# Patient Record
Sex: MALE | Race: WHITE | NOT HISPANIC OR LATINO | Employment: FULL TIME | URBAN - METROPOLITAN AREA
[De-identification: names, ages, dates, MRNs, and addresses within clinical notes are randomized per-mention and may not be internally consistent; named-entity substitution may affect disease eponyms.]

---

## 2021-09-02 ENCOUNTER — OFFICE VISIT (OUTPATIENT)
Dept: INTERNAL MEDICINE | Facility: CLINIC | Age: 36
End: 2021-09-02

## 2021-09-02 VITALS
HEART RATE: 84 BPM | BODY MASS INDEX: 24.52 KG/M2 | WEIGHT: 185 LBS | SYSTOLIC BLOOD PRESSURE: 124 MMHG | OXYGEN SATURATION: 99 % | HEIGHT: 73 IN | TEMPERATURE: 96.6 F | DIASTOLIC BLOOD PRESSURE: 72 MMHG

## 2021-09-02 DIAGNOSIS — L40.9 PSORIASIS OF SCALP: Primary | ICD-10-CM

## 2021-09-02 DIAGNOSIS — F51.01 PRIMARY INSOMNIA: ICD-10-CM

## 2021-09-02 PROCEDURE — 99204 OFFICE O/P NEW MOD 45 MIN: CPT | Performed by: INTERNAL MEDICINE

## 2021-09-02 RX ORDER — CLOBETASOL PROPIONATE 0.05 G/100ML
SHAMPOO TOPICAL DAILY
Qty: 118 ML | Refills: 10 | Status: SHIPPED | OUTPATIENT
Start: 2021-09-02 | End: 2022-08-18 | Stop reason: SDUPTHER

## 2021-09-02 RX ORDER — TRIAMCINOLONE ACETONIDE 0.25 MG/G
CREAM TOPICAL 2 TIMES DAILY PRN
Qty: 30 G | Refills: 0 | Status: SHIPPED | OUTPATIENT
Start: 2021-09-02 | End: 2021-11-01

## 2021-09-02 RX ORDER — AMITRIPTYLINE HYDROCHLORIDE 25 MG/1
25 TABLET, FILM COATED ORAL NIGHTLY PRN
Qty: 30 TABLET | Refills: 2 | Status: SHIPPED | OUTPATIENT
Start: 2021-09-02 | End: 2021-12-10

## 2021-09-02 NOTE — PROGRESS NOTES
Office Note      Date: 2021  Patient Name: Nadeem Najera  MRN: [id number]  : 1985    Chief Complaint   Patient presents with   • Insomnia   • Psoriasis       History of Present Illness: Nadeem Najera is a 36 y.o. male who presents for Insomnia and Psoriasis.  Patient is new to this clinic.  Previously seen by Dr. Abrams.  Requesting medications for insomnia.    Has tried seroquel 25mg and trazodone which gave him RLS. Has been on restoril which helped, last filled a few months ago.  No refills noted in PDMP database.  Has tried melatonin w/o relief 30 minutes prior to bedtime.  Not interested in trying melatonin.    Has had long history of psoriasis.  Has never been able to afford shampoo for psoriasis due to lack of insurance coverage.  Has been using hydrocortisone cream over-the-counter for psoriasis skin spots.  Subjective      Review of Systems:   Pertinent review of systems per HPI.    Review of Systems   Constitutional: Negative for activity change, appetite change, chills, diaphoresis and fatigue.   HENT: Negative for congestion, dental problem, drooling, ear discharge, ear pain, facial swelling and hearing loss.    Eyes: Negative for photophobia, pain, discharge, redness, itching and visual disturbance.   Respiratory: Negative for cough, choking, chest tightness and shortness of breath.    Cardiovascular: Negative for chest pain, palpitations and leg swelling.   Endocrine: Negative for cold intolerance, heat intolerance, polydipsia and polyuria.   Genitourinary: Negative for difficulty urinating, dysuria, flank pain, frequency and hematuria.   Musculoskeletal: Negative for arthralgias and back pain.   Skin: Negative for color change, pallor, rash and wound.   Allergic/Immunologic: Negative for environmental allergies.   Neurological: Negative for dizziness, facial asymmetry, light-headedness and headaches.   Psychiatric/Behavioral: Positive for sleep disturbance.   All other systems  "reviewed and are negative.    No Known Allergies    Objective     Physical Exam:  Vital Signs:   Vitals:    09/02/21 1018   BP: 124/72   BP Location: Left arm   Patient Position: Sitting   Pulse: 84   Temp: 96.6 °F (35.9 °C)   TempSrc: Infrared   SpO2: 99%   Weight: 83.9 kg (185 lb)   Height: 185.4 cm (73\")      Body mass index is 24.41 kg/m².    Physical Exam  Vitals and nursing note reviewed.   Constitutional:       General: He is not in acute distress.     Appearance: He is well-developed.   HENT:      Head: Normocephalic and atraumatic.      Right Ear: External ear normal.      Left Ear: External ear normal.   Eyes:      General: No scleral icterus.        Right eye: No discharge.         Left eye: No discharge.      Conjunctiva/sclera: Conjunctivae normal.   Cardiovascular:      Rate and Rhythm: Normal rate and regular rhythm.      Heart sounds: Normal heart sounds. No murmur heard.   No friction rub. No gallop.    Pulmonary:      Effort: Pulmonary effort is normal. No respiratory distress.      Breath sounds: Normal breath sounds. No wheezing or rales.   Skin:     General: Skin is warm and dry.      Coloration: Skin is not pale.         Assessment / Plan      Assessment & Plan:    1. Psoriasis of scalp  Rx for Clobex shampoo to use daily.  Also Rx for triamcinolone cream.    2. Primary insomnia  Discussed trying melatonin at dinnertime as its not quick acting.  We will need to get prior PCP records before prescribing any controlled substances.  Trial of Elavil.  Can also discuss doxepin at next visit.  He is interested in getting blood work done today however is unsure of insurance coverage.  Advised him to call [facility] and his insurance prior to getting lab work done.  Follow-up 1 month.      Shantell Hicks MD  09/02/2021     Please note that portions of this note may have been completed with a voice recognition program. Efforts were made to edit the dictations, but occasionally words are " mistranscribed.

## 2021-09-02 NOTE — PATIENT INSTRUCTIONS
Amitriptyline tablets  What is this medicine?  AMITRIPTYLINE (a nicolás TRIP ti rob) is used to treat depression.  This medicine may be used for other purposes; ask your health care provider or pharmacist if you have questions.  COMMON BRAND NAME(S): [place]  What should I tell my health care provider before I take this medicine?  They need to know if you have any of these conditions:  · an alcohol problem  · asthma, difficulty breathing  · bipolar disorder or schizophrenia  · difficulty passing urine, prostate trouble  · glaucoma  · heart disease or previous heart attack  · liver disease  · over active thyroid  · seizures  · thoughts or plans of suicide, a previous suicide attempt, or family history of suicide attempt  · an unusual or allergic reaction to amitriptyline, other medicines, foods, dyes, or preservatives  · pregnant or trying to get pregnant  · breast-feeding  How should I use this medicine?  Take this medicine by mouth with a drink of water. Follow the directions on the prescription label. You can take the tablets with or without food. Take your medicine at regular intervals. Do not take it more often than directed. Do not stop taking this medicine suddenly except upon the advice of your doctor. Stopping this medicine too quickly may cause serious side effects or your condition may worsen.  A special MedGuide will be given to you by the pharmacist with each prescription and refill. Be sure to read this information carefully each time.  Talk to your pediatrician regarding the use of this medicine in children. Special care may be needed.  Overdosage: If you think you have taken too much of this medicine contact a poison control center or emergency room at once.  NOTE: This medicine is only for you. Do not share this medicine with others.  What if I miss a dose?  If you miss a dose, take it as soon as you can. If it is almost time for your next dose, take only that dose. Do not take double or  extra doses.  What may interact with this medicine?  Do not take this medicine with any of the following medications:  · arsenic trioxide  · certain medicines used to regulate abnormal heartbeat or to treat other heart conditions  · cisapride  · droperidol  · halofantrine  · linezolid  · MAOIs like Carbex, Eldepryl, Marplan, Nardil, and Parnate  · methylene blue  · other medicines for mental depression  · phenothiazines like perphenazine, thioridazine and chlorpromazine  · pimozide  · probucol  · procarbazine  · sparfloxacin  · Candice's Wort  This medicine may also interact with the following medications:  · atropine and related drugs like hyoscyamine, scopolamine, tolterodine and others  · barbiturate medicines for inducing sleep or treating seizures, like phenobarbital  · cimetidine  · disulfiram  · ethchlorvynol  · thyroid hormones such as levothyroxine  · ziprasidone  This list may not describe all possible interactions. Give your health care provider a list of all the medicines, herbs, non-prescription drugs, or dietary supplements you use. Also tell them if you smoke, drink alcohol, or use illegal drugs. Some items may interact with your medicine.  What should I watch for while using this medicine?  Tell your doctor if your symptoms do not get better or if they get worse. Visit your doctor or health care professional for regular checks on your progress. Because it may take several weeks to see the full effects of this medicine, it is important to continue your treatment as prescribed by your doctor.  Patients and their families should watch out for new or worsening thoughts of suicide or depression. Also watch out for sudden changes in feelings such as feeling anxious, agitated, panicky, irritable, hostile, aggressive, impulsive, severely restless, overly excited and hyperactive, or not being able to sleep. If this happens, especially at the beginning of treatment or after a change in dose, call your health  care professional.  You may get drowsy or dizzy. Do not drive, use machinery, or do anything that needs mental alertness until you know how this medicine affects you. Do not stand or sit up quickly, especially if you are an older patient. This reduces the risk of dizzy or fainting spells. Alcohol may interfere with the effect of this medicine. Avoid alcoholic drinks.  Do not treat yourself for coughs, colds, or allergies without asking your doctor or health care professional for advice. Some ingredients can increase possible side effects.  Your mouth may get dry. Chewing sugarless gum or sucking hard candy, and drinking plenty of water will help. Contact your doctor if the problem does not go away or is severe.  This medicine may cause dry eyes and blurred vision. If you wear contact lenses you may feel some discomfort. Lubricating drops may help. See your eye doctor if the problem does not go away or is severe.  This medicine can cause constipation. Try to have a bowel movement at least every 2 to 3 days. If you do not have a bowel movement for 3 days, call your doctor or health care professional.  This medicine can make you more sensitive to the sun. Keep out of the sun. If you cannot avoid being in the sun, wear protective clothing and use sunscreen. Do not use sun lamps or tanning beds/booths.  What side effects may I notice from receiving this medicine?  Side effects that you should report to your doctor or health care professional as soon as possible:  · allergic reactions like skin rash, itching or hives, swelling of the face, lips, or tongue  · anxious  · breathing problems  · changes in vision  · confusion  · elevated mood, decreased need for sleep, racing thoughts, impulsive behavior  · eye pain  · fast, irregular heartbeat  · feeling faint or lightheaded, falls  · feeling agitated, angry, or irritable  · fever with increased sweating  · hallucination, loss of contact with reality  · seizures  · stiff  muscles  · suicidal thoughts or other mood changes  · tingling, pain, or numbness in the feet or hands  · trouble passing urine or change in the amount of urine  · trouble sleeping  · unusually weak or tired  · vomiting  · yellowing of the eyes or skin  Side effects that usually do not require medical attention (report to your doctor or health care professional if they continue or are bothersome):  · change in sex drive or performance  · change in appetite or weight  · constipation  · dizziness  · dry mouth  · nausea  · tired  · tremors  · upset stomach  This list may not describe all possible side effects. Call your doctor for medical advice about side effects. You may report side effects to FDA at 6-982-YXJ-3796.  Where should I keep my medicine?  Keep out of the reach of children.  Store at room temperature between 20 and 25 degrees C (68 and 77 degrees F). Throw away any unused medicine after the expiration date.  NOTE: This sheet is a summary. It may not cover all possible information. If you have questions about this medicine, talk to your doctor, pharmacist, or health care provider.  © 2021 [employer]vier/Gold Standard (2019-12-10 13:04:32)

## 2021-11-01 RX ORDER — TRIAMCINOLONE ACETONIDE 0.25 MG/G
CREAM TOPICAL
Qty: 80 G | Refills: 1 | Status: SHIPPED | OUTPATIENT
Start: 2021-11-01 | End: 2022-01-13

## 2021-12-10 RX ORDER — AMITRIPTYLINE HYDROCHLORIDE 25 MG/1
TABLET, FILM COATED ORAL
Qty: 30 TABLET | Refills: 2 | Status: SHIPPED | OUTPATIENT
Start: 2021-12-10 | End: 2023-01-26

## 2021-12-23 ENCOUNTER — OFFICE VISIT (OUTPATIENT)
Dept: INTERNAL MEDICINE | Facility: CLINIC | Age: 36
End: 2021-12-23

## 2021-12-23 ENCOUNTER — LAB (OUTPATIENT)
Dept: LAB | Facility: HOSPITAL | Age: 36
End: 2021-12-23

## 2021-12-23 VITALS
RESPIRATION RATE: 20 BRPM | BODY MASS INDEX: 23.86 KG/M2 | DIASTOLIC BLOOD PRESSURE: 76 MMHG | HEIGHT: 73 IN | WEIGHT: 180 LBS | SYSTOLIC BLOOD PRESSURE: 122 MMHG | TEMPERATURE: 97.2 F | OXYGEN SATURATION: 98 % | HEART RATE: 108 BPM

## 2021-12-23 DIAGNOSIS — L03.119 CELLULITIS OF UPPER EXTREMITY, UNSPECIFIED LATERALITY: ICD-10-CM

## 2021-12-23 DIAGNOSIS — L98.9 SKIN LESIONS, GENERALIZED: ICD-10-CM

## 2021-12-23 DIAGNOSIS — L98.9 SKIN LESIONS, GENERALIZED: Primary | ICD-10-CM

## 2021-12-23 LAB
ALBUMIN SERPL-MCNC: 4 G/DL (ref 3.5–5.2)
ALBUMIN/GLOB SERPL: 1.4 G/DL
ALP SERPL-CCNC: 60 U/L (ref 39–117)
ALT SERPL W P-5'-P-CCNC: 83 U/L (ref 1–41)
ANION GAP SERPL CALCULATED.3IONS-SCNC: 9.9 MMOL/L (ref 5–15)
AST SERPL-CCNC: 38 U/L (ref 1–40)
BASOPHILS # BLD AUTO: 0.09 10*3/MM3 (ref 0–0.2)
BASOPHILS NFR BLD AUTO: 0.8 % (ref 0–1.5)
BILIRUB SERPL-MCNC: 0.2 MG/DL (ref 0–1.2)
BUN SERPL-MCNC: 7 MG/DL (ref 6–20)
BUN/CREAT SERPL: 7.4 (ref 7–25)
CALCIUM SPEC-SCNC: 9.1 MG/DL (ref 8.6–10.5)
CHLORIDE SERPL-SCNC: 100 MMOL/L (ref 98–107)
CO2 SERPL-SCNC: 29.1 MMOL/L (ref 22–29)
CREAT SERPL-MCNC: 0.95 MG/DL (ref 0.76–1.27)
DEPRECATED RDW RBC AUTO: 44.5 FL (ref 37–54)
EOSINOPHIL # BLD AUTO: 0.14 10*3/MM3 (ref 0–0.4)
EOSINOPHIL NFR BLD AUTO: 1.3 % (ref 0.3–6.2)
ERYTHROCYTE [DISTWIDTH] IN BLOOD BY AUTOMATED COUNT: 13.8 % (ref 12.3–15.4)
GFR SERPL CREATININE-BSD FRML MDRD: 90 ML/MIN/1.73
GLOBULIN UR ELPH-MCNC: 2.9 GM/DL
GLUCOSE SERPL-MCNC: 97 MG/DL (ref 65–99)
HCT VFR BLD AUTO: 41.9 % (ref 37.5–51)
HGB BLD-MCNC: 14.1 G/DL (ref 13–17.7)
HIV1+2 AB SER QL: NORMAL
IMM GRANULOCYTES # BLD AUTO: 0.06 10*3/MM3 (ref 0–0.05)
IMM GRANULOCYTES NFR BLD AUTO: 0.6 % (ref 0–0.5)
LYMPHOCYTES # BLD AUTO: 2.04 10*3/MM3 (ref 0.7–3.1)
LYMPHOCYTES NFR BLD AUTO: 19.1 % (ref 19.6–45.3)
MCH RBC QN AUTO: 30 PG (ref 26.6–33)
MCHC RBC AUTO-ENTMCNC: 33.7 G/DL (ref 31.5–35.7)
MCV RBC AUTO: 89.1 FL (ref 79–97)
MONOCYTES # BLD AUTO: 1.1 10*3/MM3 (ref 0.1–0.9)
MONOCYTES NFR BLD AUTO: 10.3 % (ref 5–12)
NEUTROPHILS NFR BLD AUTO: 67.9 % (ref 42.7–76)
NEUTROPHILS NFR BLD AUTO: 7.23 10*3/MM3 (ref 1.7–7)
NRBC BLD AUTO-RTO: 0 /100 WBC (ref 0–0.2)
PLATELET # BLD AUTO: 373 10*3/MM3 (ref 140–450)
PMV BLD AUTO: 10.9 FL (ref 6–12)
POTASSIUM SERPL-SCNC: 4.6 MMOL/L (ref 3.5–5.2)
PROT SERPL-MCNC: 6.9 G/DL (ref 6–8.5)
RBC # BLD AUTO: 4.7 10*6/MM3 (ref 4.14–5.8)
SODIUM SERPL-SCNC: 139 MMOL/L (ref 136–145)
WBC NRBC COR # BLD: 10.66 10*3/MM3 (ref 3.4–10.8)

## 2021-12-23 PROCEDURE — G0432 EIA HIV-1/HIV-2 SCREEN: HCPCS

## 2021-12-23 PROCEDURE — 99213 OFFICE O/P EST LOW 20 MIN: CPT | Performed by: PHYSICIAN ASSISTANT

## 2021-12-23 PROCEDURE — 80053 COMPREHEN METABOLIC PANEL: CPT

## 2021-12-23 PROCEDURE — 85025 COMPLETE CBC W/AUTO DIFF WBC: CPT

## 2021-12-23 PROCEDURE — 36415 COLL VENOUS BLD VENIPUNCTURE: CPT

## 2021-12-23 RX ORDER — CEPHALEXIN 500 MG/1
500 CAPSULE ORAL 2 TIMES DAILY
Qty: 20 CAPSULE | Refills: 0 | Status: SHIPPED | OUTPATIENT
Start: 2021-12-23 | End: 2022-01-02

## 2021-12-23 NOTE — PROGRESS NOTES
[facility]  [address]  [facility] [address]  Dept: [phone]  Dept Fax: [phone]  Loc: [phone]  Loc Fax: [phone]    SUSAN Prattsafia  1985    Follow Up Office Visit Note    History of Present Illness:  Patient is a 36-year-old male in today for skin lesions.  Patient states that he has got several sores on his arms and legs that have arisen out of nowhere.  Patient has had these for about 5 days now.  Patient denies changing any detergents or any soaps.  Patient does have a dog at home goes out in the woods at times.      The following portions of the patient's history were reviewed and updated as appropriate: allergies, current medications, past family history, past medical history, past social history, past surgical history, and problem list.    Medications:    Current Outpatient Medications:   •  amitriptyline (ELAVIL) 25 MG tablet, TAKE ONE TABLET BY MOUTH EVERY NIGHT AT BEDTIME AS NEEDED FOR SLEEP, Disp: 30 tablet, Rfl: 2  •  clobetasol propionate (CLOBEX) 0.05 % shampoo, Apply  topically to the appropriate area as directed Daily., Disp: 118 mL, Rfl: 10  •  triamcinolone (KENALOG) 0.025 % cream, APPLY TO AFFECTED AREA(S) TWO TIMES A DAY AS NEEDED FOR RASH. DO NO USE ON FACE FOR LONGER THAN 7 DAYS., Disp: 80 g, Rfl: 1  •  cephalexin (Keflex) 500 MG capsule, Take 1 capsule by mouth 2 (Two) Times a Day for 10 days., Disp: 20 capsule, Rfl: 0    Subjective  No Known Allergies     History reviewed. No pertinent past medical history.    History reviewed. No pertinent surgical history.    History reviewed. No pertinent family history.     Social History     Socioeconomic History   • Marital status: Single   Tobacco Use   • Smoking status: Never Smoker   • Smokeless tobacco: Never Used   Substance and Sexual Activity   • Alcohol use: Never       Review of Systems   Constitutional: Negative for activity change, chills, fatigue, fever  "and unexpected weight change.   HENT: Negative for congestion, ear pain, postnasal drip, sinus pressure and sore throat.    Eyes: Negative for pain, discharge and redness.   Respiratory: Negative for cough, shortness of breath and wheezing.    Cardiovascular: Negative for chest pain, palpitations and leg swelling.   Gastrointestinal: Negative for diarrhea, nausea and vomiting.   Endocrine: Negative for cold intolerance and heat intolerance.   Genitourinary: Negative for decreased urine volume and dysuria.   Musculoskeletal: Negative for arthralgias and myalgias.   Skin: Positive for wound. Negative for rash.   Neurological: Negative for dizziness, light-headedness and headaches.   Hematological: Does not bruise/bleed easily.   Psychiatric/Behavioral: Negative for confusion, dysphoric mood and sleep disturbance. The patient is not nervous/anxious.          Objective  Vitals:    12/23/21 0822   BP: 122/76   Pulse: 108   Resp: 20   Temp: 97.2 °F (36.2 °C)   TempSrc: Temporal   SpO2: 98%   Weight: 81.6 kg (180 lb)   Height: 185.4 cm (73\")     Body mass index is 23.75 kg/m².     Physical Exam  Physical Exam  Vitals and nursing note reviewed.   Constitutional:       General: He is not in acute distress.     Appearance: He is not ill-appearing.   HENT:      Head: Normocephalic.      Right Ear: Tympanic membrane, ear canal and external ear normal. There is no impacted cerumen.      Left Ear: Tympanic membrane, ear canal and external ear normal. There is no impacted cerumen.      Nose: No congestion or rhinorrhea.      Mouth/Throat:      Mouth: Mucous membranes are moist.      Pharynx: Oropharynx is clear. No oropharyngeal exudate or posterior oropharyngeal erythema.   Eyes:      General:         Right eye: No discharge.         Left eye: No discharge.      Extraocular Movements: Extraocular movements intact.      Conjunctiva/sclera: Conjunctivae normal.      Pupils: Pupils are equal, round, and reactive to light. "   Cardiovascular:      Rate and Rhythm: Normal rate and regular rhythm.      Heart sounds: Normal heart sounds. No murmur heard.  No friction rub. No gallop.    Pulmonary:      Effort: Pulmonary effort is normal. No respiratory distress.      Breath sounds: Normal breath sounds. No wheezing.   Abdominal:      General: Bowel sounds are normal. There is no distension.      Palpations: Abdomen is soft. There is no mass.      Tenderness: There is no abdominal tenderness.   Musculoskeletal:         General: No swelling. Normal range of motion.      Cervical back: Normal range of motion. No tenderness.      Right lower leg: No edema.      Left lower leg: No edema.   Lymphadenopathy:      Cervical: No cervical adenopathy.   Skin:     Findings: Erythema present. No bruising or rash.   Neurological:      Mental Status: He is oriented to person, place, and time.      Gait: Gait normal.   Psychiatric:         Mood and Affect: Mood normal.         Behavior: Behavior normal.         Thought Content: Thought content normal.         Judgment: Judgment normal.         Diagnostic Data  Procedures    Assessment  Diagnoses and all orders for this visit:    1. Skin lesions, generalized (Primary)  -     Ambulatory Referral to Dermatology  -     CBC w AUTO Differential; Future  -     Comprehensive Metabolic Panel  -     HIV-1 & HIV-2 Antibodies; Future    2. Cellulitis of upper extremity, unspecified laterality  -     cephalexin (Keflex) 500 MG capsule; Take 1 capsule by mouth 2 (Two) Times a Day for 10 days.  Dispense: 20 capsule; Refill: 0        Plan    1. Skin lesions, generalized (Primary)- worse, obtain labs per above. Referred to dermatology.    2. Cellulitis of upper extremity, unspecified laterality- keflex 500 mg tid x 10 days.        Return in about 3 weeks (around 1/13/2022) for w/ Dr. Hicks.    Nickolas Blake PA-C  12/23/2021

## 2022-01-13 ENCOUNTER — OFFICE VISIT (OUTPATIENT)
Dept: INTERNAL MEDICINE | Facility: CLINIC | Age: 37
End: 2022-01-13

## 2022-01-13 VITALS
HEIGHT: 73 IN | OXYGEN SATURATION: 98 % | WEIGHT: 185 LBS | DIASTOLIC BLOOD PRESSURE: 80 MMHG | HEART RATE: 80 BPM | TEMPERATURE: 97.4 F | SYSTOLIC BLOOD PRESSURE: 130 MMHG | BODY MASS INDEX: 24.52 KG/M2 | RESPIRATION RATE: 20 BRPM

## 2022-01-13 DIAGNOSIS — L40.9 PSORIASIS: ICD-10-CM

## 2022-01-13 DIAGNOSIS — L40.50 PSORIATIC ARTHRITIS: ICD-10-CM

## 2022-01-13 DIAGNOSIS — F51.01 PRIMARY INSOMNIA: Primary | ICD-10-CM

## 2022-01-13 PROCEDURE — 99214 OFFICE O/P EST MOD 30 MIN: CPT | Performed by: INTERNAL MEDICINE

## 2022-01-13 RX ORDER — ZOLPIDEM TARTRATE 5 MG/1
5 TABLET ORAL NIGHTLY PRN
Qty: 20 TABLET | Refills: 0 | Status: SHIPPED | OUTPATIENT
Start: 2022-01-13 | End: 2022-02-10

## 2022-01-13 RX ORDER — MOMETASONE FUROATE 1 MG/G
1 CREAM TOPICAL DAILY
Qty: 45 G | Refills: 6 | Status: SHIPPED | OUTPATIENT
Start: 2022-01-13 | End: 2022-08-18 | Stop reason: SDUPTHER

## 2022-01-13 NOTE — PROGRESS NOTES
"     Office Note      Date: 2022  Patient Name: SUSAN Najera  MRN: [id number]  : 1985    Chief Complaint   Patient presents with   • Psoriasis   • Insomnia       History of Present Illness: SUSAN Najera is a 36 y.o. male who presents for Psoriasis and Insomnia.   Has had benzos in the past which helped w/ sleep. seroquel gave him RLS.   Vistaril did not help. Elavil is not working well.     Triamcinolone cream did not help w/ psoriasis patches. Has joint pain.       Subjective      Review of Systems:   Pertinent review of systems per HPI.    Review of Systems   Constitutional: Negative for activity change, appetite change, chills, diaphoresis and fatigue.   HENT: Negative for congestion, dental problem, drooling, ear discharge, ear pain, facial swelling and hearing loss.    Eyes: Negative for photophobia, pain, discharge, redness, itching and visual disturbance.   Respiratory: Negative for cough, choking, chest tightness and shortness of breath.    Cardiovascular: Negative for chest pain, palpitations and leg swelling.   Endocrine: Negative for cold intolerance, heat intolerance, polydipsia and polyuria.   Genitourinary: Negative for difficulty urinating, dysuria, flank pain, frequency and hematuria.   Musculoskeletal: Negative for arthralgias and back pain.   Skin: Negative for color change, pallor, rash and wound.   Allergic/Immunologic: Negative for environmental allergies.   Neurological: Negative for dizziness, facial asymmetry, light-headedness and headaches.   Psychiatric/Behavioral: Negative.    All other systems reviewed and are negative.    No Known Allergies    Objective     Physical Exam:  Vital Signs:   Vitals:    22 0841   BP: 130/80   Pulse: 80   Resp: 20   Temp: 97.4 °F (36.3 °C)   TempSrc: Temporal   SpO2: 98%   Weight: 83.9 kg (185 lb)   Height: 185.4 cm (73\")      Body mass index is 24.41 kg/m².    Physical Exam  Vitals and nursing note reviewed.   Constitutional:  "      General: He is not in acute distress.     Appearance: He is well-developed.   HENT:      Head: Normocephalic and atraumatic.      Right Ear: External ear normal.      Left Ear: External ear normal.   Eyes:      General: No scleral icterus.        Right eye: No discharge.         Left eye: No discharge.      Conjunctiva/sclera: Conjunctivae normal.   Cardiovascular:      Rate and Rhythm: Normal rate and regular rhythm.      Heart sounds: Normal heart sounds. No murmur heard.  No friction rub. No gallop.    Pulmonary:      Effort: Pulmonary effort is normal. No respiratory distress.      Breath sounds: Normal breath sounds. No wheezing or rales.   Skin:     General: Skin is warm and dry.      Coloration: Skin is not pale.      Comments: Disfigured nails, dry scaly patches on extremities         Assessment / Plan      Assessment & Plan:    1. Primary insomnia  Will try ambien. Discussed this is not for daily use and this prescription should last him longer than a month. If he needs further refills he will need an OV. Also discussed controlled substance policy and urine drug screens  - zolpidem (AMBIEN) 5 MG tablet; Take 1 tablet by mouth At Night As Needed for Sleep.  Dispense: 20 tablet; Refill: 0    2. Psoriasis  Increase strength of steroid cream  - mometasone (Elocon) 0.1 % cream; Apply 1 application topically to the appropriate area as directed Daily.  Dispense: 45 g; Refill: 6    3. Psoriatic arthritis (HCC)    - Ambulatory Referral to Rheumatology      Shantell Hicks MD  01/13/2022

## 2022-02-09 DIAGNOSIS — F51.01 PRIMARY INSOMNIA: ICD-10-CM

## 2022-02-09 NOTE — TELEPHONE ENCOUNTER
Caller: MirnbSUSAN baig    Relationship: Self    Best call back number:     Requested Prescriptions:   Requested Prescriptions     Pending Prescriptions Disp Refills   • zolpidem (AMBIEN) 5 MG tablet [Pharmacy Med Name: ZOLPIDEM TARTRATE 5 MG TABLET] 20 tablet      Sig: TAKE ONE TABLET BY MOUTH EVERY NIGHT AT BEDTIME AS NEEDED FOR SLEEP        Pharmacy where request should be sent: [facility] [address] [place] [address] - [phone]  - [phone] FX     Additional details provided by patient: PATIENT HAS ONE LEFT     Does the patient have less than a 3 day supply:  [x] Yes  [] No    Horacio Fernández Rep   02/09/22 15:22 EST

## 2022-02-10 DIAGNOSIS — F51.01 PRIMARY INSOMNIA: ICD-10-CM

## 2022-02-10 RX ORDER — ZOLPIDEM TARTRATE 5 MG/1
5 TABLET ORAL NIGHTLY PRN
Qty: 20 TABLET | Refills: 0 | OUTPATIENT
Start: 2022-02-10

## 2022-02-10 RX ORDER — ZOLPIDEM TARTRATE 5 MG/1
TABLET ORAL
Qty: 20 TABLET | Refills: 0 | Status: SHIPPED | OUTPATIENT
Start: 2022-02-10 | End: 2022-03-01 | Stop reason: SDUPTHER

## 2022-02-10 NOTE — TELEPHONE ENCOUNTER
Caller: SUSAN Najera    Relationship: Self    Best call back number: [phone]    Requested Prescriptions:   Requested Prescriptions     Pending Prescriptions Disp Refills   • zolpidem (AMBIEN) 5 MG tablet 20 tablet 0     Sig: Take 1 tablet by mouth At Night As Needed for Sleep.        Pharmacy where request should be sent: [facility] [place] [address] [place] - [phone]  - [phone] FX     Additional details provided by patient: patient is completely out of medication and patient would also like a refill of mometasone (Elocon) 0.1 % cream and he is completely out of the cream also    Does the patient have less than a 3 day supply:  [x] Yes  [] No    Horacio Kwan Rep   02/10/22 10:30 EST

## 2022-03-01 ENCOUNTER — TELEPHONE (OUTPATIENT)
Dept: INTERNAL MEDICINE | Facility: CLINIC | Age: 37
End: 2022-03-01

## 2022-03-01 DIAGNOSIS — F51.01 PRIMARY INSOMNIA: ICD-10-CM

## 2022-03-01 RX ORDER — ZOLPIDEM TARTRATE 5 MG/1
5 TABLET ORAL NIGHTLY PRN
Qty: 20 TABLET | Refills: 0 | Status: SHIPPED | OUTPATIENT
Start: 2022-03-01 | End: 2022-04-19

## 2022-03-01 RX ORDER — ZOLPIDEM TARTRATE 5 MG/1
5 TABLET ORAL NIGHTLY PRN
Qty: 20 TABLET | Refills: 0 | Status: CANCELLED | OUTPATIENT
Start: 2022-03-01

## 2022-03-01 NOTE — TELEPHONE ENCOUNTER
Caller: MirnbSUSAN baig    Relationship: Self    Best call back number:     [phone]     Requested Prescriptions:      zolpidem (AMBIEN) 5 MG tablet    Pharmacy where request should be sent:      [facility] - [place], [place]    TELEPHONE CONTACT:    [phone]    Additional details provided by patient:     PATIENT IS OUT OF THE MEDICATION    Does the patient have less than a 3 day supply:  [x] Yes  [] No    Horacio Gamez Rep   03/01/22 14:23 EST     DR MAYEN

## 2022-03-01 NOTE — TELEPHONE ENCOUNTER
Pt states he needs a early refill because he is leaving town tomorrow for a few weeks. States he wont be able to come back to the pharmacy.

## 2022-03-01 NOTE — TELEPHONE ENCOUNTER
ambien is only as needed and not for him to take everyday. If he is requiring it daily he will need referral to sleep medicine. Last refill was 3 weeks ago, refill request is too early.     Dr. Hicks

## 2022-04-14 DIAGNOSIS — F51.01 PRIMARY INSOMNIA: ICD-10-CM

## 2022-04-19 RX ORDER — ZOLPIDEM TARTRATE 5 MG/1
TABLET ORAL
Qty: 20 TABLET | Refills: 0 | Status: SHIPPED | OUTPATIENT
Start: 2022-04-19 | End: 2023-01-27

## 2022-05-05 DIAGNOSIS — F51.01 PRIMARY INSOMNIA: ICD-10-CM

## 2022-05-12 DIAGNOSIS — F51.01 PRIMARY INSOMNIA: ICD-10-CM

## 2022-05-12 RX ORDER — ZOLPIDEM TARTRATE 5 MG/1
5 TABLET ORAL NIGHTLY PRN
Qty: 20 TABLET | Refills: 0 | OUTPATIENT
Start: 2022-05-12

## 2022-05-12 NOTE — TELEPHONE ENCOUNTER
Caller: SUSAN Najera    Relationship: Self    Best call back number: [phone]    Requested Prescriptions:   Requested Prescriptions     Pending Prescriptions Disp Refills   • zolpidem (AMBIEN) 5 MG tablet 20 tablet 0     Sig: Take 1 tablet by mouth At Night As Needed for Sleep.        Pharmacy where request should be sent: [facility] [place] [address] [place] - [phone]  - [phone] FX     Additional details provided by patient:   PATIENT IS COMPLETELY OUT OF MEDICATION     Does the patient have less than a 3 day supply:  [x] Yes  [] No    Horacio Johnson Rep   05/12/22 12:06 EDT

## 2022-05-16 RX ORDER — ZOLPIDEM TARTRATE 5 MG/1
TABLET ORAL
Qty: 20 TABLET | OUTPATIENT
Start: 2022-05-16

## 2022-05-17 ENCOUNTER — TELEPHONE (OUTPATIENT)
Dept: INTERNAL MEDICINE | Facility: CLINIC | Age: 37
End: 2022-05-17

## 2022-05-17 DIAGNOSIS — F51.01 PRIMARY INSOMNIA: ICD-10-CM

## 2022-05-17 RX ORDER — ZOLPIDEM TARTRATE 5 MG/1
5 TABLET ORAL NIGHTLY PRN
Qty: 20 TABLET | Refills: 0 | Status: CANCELLED | OUTPATIENT
Start: 2022-05-17

## 2022-05-17 NOTE — TELEPHONE ENCOUNTER
Caller: SUSAN Najera    Relationship: Self    Best call back number: [phone]  Requested Prescriptions:   Requested Prescriptions     Pending Prescriptions Disp Refills   • zolpidem (AMBIEN) 5 MG tablet 20 tablet 0     Sig: Take 1 tablet by mouth At Night As Needed for Sleep.        Pharmacy where request should be sent: [facility] [place] [address] [address] - [phone]  - [phone] FX     Additional details provided by patient: PATIENT SCHEDULED 05/24/    Does the patient have less than a 3 day supply:  [] Yes  [] No    Horacio Sanchez Rep   05/17/22 13:41 EDT

## 2022-05-18 NOTE — TELEPHONE ENCOUNTER
PATIENT CALLED TO CHECK THE STATUS OF HIS REQUEST FOR MEDICATION REFILL TO COVER [facility]AP BETWEEN NOW AND NEXT SCHEDULED APPOINTMENT SET FOR 5/24/22    PATIENT ALSO REITERATED PRESCRIPTION IS TO BE SENT TO [facility] - [place], [place]    TELEPHONE CONTACT:    [phone]    PLEASE CONTACT PATIENT WITH ANY UPDATES    DR MAYEN

## 2022-05-18 NOTE — TELEPHONE ENCOUNTER
Mazin is asking for you to write enough to get him to his [id number] office visit.    Also amitriptyline (ELAVIL) 25 MG tablet    Please send both to [facility]/pharmacy #[id number] - [place], [place] - [address] AT NEXT TO [facility] - [phone]  - [phone] FX

## 2022-08-18 ENCOUNTER — TELEPHONE (OUTPATIENT)
Dept: FAMILY MEDICINE CLINIC | Facility: CLINIC | Age: 37
End: 2022-08-18

## 2022-08-18 DIAGNOSIS — L40.9 PSORIASIS: ICD-10-CM

## 2022-08-18 RX ORDER — CLOBETASOL PROPIONATE 0.05 G/100ML
SHAMPOO TOPICAL DAILY
Qty: 118 ML | Refills: 1 | Status: SHIPPED | OUTPATIENT
Start: 2022-08-18 | End: 2023-01-27 | Stop reason: SDUPTHER

## 2022-08-18 RX ORDER — MOMETASONE FUROATE 1 MG/G
1 CREAM TOPICAL DAILY
Qty: 45 G | Refills: 1 | Status: SHIPPED | OUTPATIENT
Start: 2022-08-18 | End: 2023-01-26

## 2022-08-18 NOTE — TELEPHONE ENCOUNTER
Caller: SUSAN Najera    Relationship: Self    Best call back number: [phone]    Requested Prescriptions:   Requested Prescriptions     Pending Prescriptions Disp Refills   • clobetasol propionate (CLOBEX) 0.05 % shampoo 118 mL 10     Sig: Apply  topically to the appropriate area as directed Daily.   • mometasone (Elocon) 0.1 % cream 45 g 6     Sig: Apply 1 application topically to the appropriate area as directed Daily.        Pharmacy where request should be sent: [facility]/PHARMACY #[id number] - [place], [place] - [facility] [address] NEXT TO [facility] - [phone] [facility] [phone] FX     Does the patient have less than a 3 day supply:  [x] Yes  [] No    Horacio Gamez Rep   08/18/22 16:02 EDT

## 2022-09-14 RX ORDER — PAROXETINE HYDROCHLORIDE 40 MG/1
TABLET, FILM COATED ORAL
Qty: 30 TABLET | Refills: 1 | OUTPATIENT
Start: 2022-09-14

## 2023-01-18 RX ORDER — CLOBETASOL PROPIONATE 0.05 G/100ML
SHAMPOO TOPICAL DAILY
Qty: 118 ML | Refills: 1 | OUTPATIENT
Start: 2023-01-18

## 2023-01-26 RX ORDER — CLOBETASOL PROPIONATE 0.5 MG/G
CREAM TOPICAL
COMMUNITY
Start: 2022-11-22 | End: 2023-01-27

## 2023-01-26 RX ORDER — MIRTAZAPINE 30 MG/1
30 TABLET, FILM COATED ORAL
COMMUNITY
Start: 2022-12-31

## 2023-01-26 RX ORDER — BUPRENORPHINE 100 MG/1
SOLUTION SUBCUTANEOUS
COMMUNITY
Start: 2023-01-10 | End: 2023-04-05

## 2023-01-26 RX ORDER — BUPROPION HYDROCHLORIDE 150 MG/1
150 TABLET ORAL EVERY MORNING
COMMUNITY
Start: 2022-12-31

## 2023-01-26 RX ORDER — METHOCARBAMOL 750 MG/1
750 TABLET, FILM COATED ORAL 3 TIMES DAILY
COMMUNITY
Start: 2022-11-23 | End: 2023-01-26

## 2023-01-26 RX ORDER — BACLOFEN 10 MG/1
10 TABLET ORAL 3 TIMES DAILY
COMMUNITY
Start: 2022-11-15 | End: 2023-01-26

## 2023-01-26 RX ORDER — BUSPIRONE HYDROCHLORIDE 30 MG/1
30 TABLET ORAL 2 TIMES DAILY
COMMUNITY
Start: 2022-11-23 | End: 2023-04-05

## 2023-01-27 ENCOUNTER — OFFICE VISIT (OUTPATIENT)
Dept: FAMILY MEDICINE CLINIC | Facility: CLINIC | Age: 38
End: 2023-01-27
Payer: MEDICAID

## 2023-01-27 VITALS
SYSTOLIC BLOOD PRESSURE: 128 MMHG | BODY MASS INDEX: 24.93 KG/M2 | OXYGEN SATURATION: 99 % | HEART RATE: 89 BPM | HEIGHT: 73 IN | TEMPERATURE: 97.7 F | DIASTOLIC BLOOD PRESSURE: 70 MMHG | WEIGHT: 188.13 LBS

## 2023-01-27 DIAGNOSIS — Z23 NEED FOR VACCINATION: ICD-10-CM

## 2023-01-27 DIAGNOSIS — F32.A ANXIETY AND DEPRESSION: ICD-10-CM

## 2023-01-27 DIAGNOSIS — F51.04 PSYCHOPHYSIOLOGICAL INSOMNIA: ICD-10-CM

## 2023-01-27 DIAGNOSIS — L40.9 PSORIASIS: ICD-10-CM

## 2023-01-27 DIAGNOSIS — F41.9 ANXIETY AND DEPRESSION: ICD-10-CM

## 2023-01-27 DIAGNOSIS — F17.290 VAPING NICOTINE DEPENDENCE, TOBACCO PRODUCT: ICD-10-CM

## 2023-01-27 DIAGNOSIS — M54.50 CHRONIC BILATERAL LOW BACK PAIN WITHOUT SCIATICA: Primary | ICD-10-CM

## 2023-01-27 DIAGNOSIS — F19.10: ICD-10-CM

## 2023-01-27 DIAGNOSIS — G89.29 CHRONIC BILATERAL LOW BACK PAIN WITHOUT SCIATICA: Primary | ICD-10-CM

## 2023-01-27 DIAGNOSIS — B18.2 CHRONIC HEPATITIS C WITHOUT HEPATIC COMA: ICD-10-CM

## 2023-01-27 DIAGNOSIS — F90.9 ADULT ADHD: ICD-10-CM

## 2023-01-27 PROCEDURE — 99215 OFFICE O/P EST HI 40 MIN: CPT | Performed by: INTERNAL MEDICINE

## 2023-01-27 RX ORDER — OMEPRAZOLE 20 MG/1
20 TABLET, DELAYED RELEASE ORAL DAILY
COMMUNITY

## 2023-01-27 RX ORDER — CLOBETASOL PROPIONATE 0.05 G/100ML
SHAMPOO TOPICAL DAILY
Qty: 118 ML | Refills: 2 | Status: SHIPPED | OUTPATIENT
Start: 2023-01-27 | End: 2023-03-16 | Stop reason: SDUPTHER

## 2023-01-27 RX ORDER — TRIAMCINOLONE ACETONIDE 1 MG/G
1 CREAM TOPICAL 2 TIMES DAILY
Qty: 28.4 G | Refills: 2 | Status: SHIPPED | OUTPATIENT
Start: 2023-01-27

## 2023-01-27 RX ORDER — TIZANIDINE 4 MG/1
4 TABLET ORAL EVERY 8 HOURS PRN
Qty: 20 TABLET | Refills: 0 | Status: SHIPPED | OUTPATIENT
Start: 2023-01-27 | End: 2023-04-05

## 2023-01-27 RX ORDER — NAPROXEN 375 MG/1
375 TABLET ORAL 2 TIMES DAILY PRN
Qty: 60 TABLET | Refills: 2 | Status: SHIPPED | OUTPATIENT
Start: 2023-01-27 | End: 2023-02-26

## 2023-01-27 NOTE — ASSESSMENT & PLAN NOTE
Previous use of Effexor 150 mg ER, Wellbutrin on 7/25/2017, Celexa in 2017 with some benefit.  Subsequent prescriptions from psychiatry at [facility] being Paxil 30 mg daily, buspirone 15 mg twice daily, Ambien at night.  Most recently, he had been on regimen of Ambien, Wellbutrin, buspirone and Remeron.  He stopped Ambien, has been in the process of weaning off the buspirone, with a goal to stop the medicines as he feels he is doing overall well.  I have cautioned him to gradually transition off, but discuss this with psychiatry if he has further concerns.  I would also be happy to reassess myself in the future if he desires.   I continue to recommend lifestyle modifications to benefit needed including improved socialization, proceeded of activities enjoyment, et cetera.  Advised new-onset concerns, SI/HI would need to be urgent reevaluated.

## 2023-01-27 NOTE — ASSESSMENT & PLAN NOTE
Hepatitis C virus positive, verified with blood work 5/17/2022.  Follow-up subsequently with [facility] which was performed in essence through the phone with laboratory blood work done through [facility] showed hepatitis C 1a genotype for which he has completed a round of antiviral treatment.  He needs to call back to [facility] clinic because it sounds like he needs to have his final laboratory assessment to see if he is eradicated hepatitis C.  He will pursue soon.

## 2023-01-27 NOTE — ASSESSMENT & PLAN NOTE
Ongoing vaping daily, previous smoking/cigarette use cessation in approximately 2021.  I have nonetheless reinforced that there are significant risk of ongoing vaping and recommend cessation.  He will consider and try to start cutting back.

## 2023-01-27 NOTE — ASSESSMENT & PLAN NOTE
Longstanding pattern for which it waxes and wanes, most notably flaring up in the winter months.  Clobetasol 0.05% shampoo has generally done well but he has notable flare in the scalp region, and historically triamcinolone 0.1% cream benefits for nonfacial regions.  I refill these medicines, otherwise reinforcing good skin hygiene which has been discussed also in the past.  Historically some tendency for secondary impetigo, currently doing better.

## 2023-01-27 NOTE — ASSESSMENT & PLAN NOTE
Somewhat of a longstanding pattern for the last couple years, waxing waning with more right greater than left lower back pain in the mid lumbar region.  No neurologic concerns, negative flip sign.  Preservation of strength sensation reflexes.  Due to chronicity I would like to obtain x-ray of the lumbar spine locally at [facility], if reassuring pursue physical therapy to evaluate and treat for more long-term benefits.  For short-term benefit I provided tizanidine 4 mg tablet 3 times daily as needed, use mostly nighttime, caution sedation.  I have addition provided naproxen 375 mg twice daily as needed, to be used in short burst when he has more of a notable flare.  Heating pad, light stretching.  Reassess at follow-up.

## 2023-01-27 NOTE — ASSESSMENT & PLAN NOTE
Plan to have Tdap given ACS past due, but patient left before being administered.  Plan to give at his follow-up visit.

## 2023-01-27 NOTE — ASSESSMENT & PLAN NOTE
Long-standing previous diagnosis, with evaluation by a psychologist with positive pattern of ADHD as would be expected.  I discussed the potential for medication treatment, although with his history of substance abuse, stimulant therapy would not be preferable, Strattera was equivocally beneficial, he could consider guanfacine the future, but at this time does not desire any medication.

## 2023-01-27 NOTE — ASSESSMENT & PLAN NOTE
longstanding and associated to anxiety and depressive symptoms for which she had previously been using Ambien and Remeron, weaning off Ambien previously, Remeron in the process of weaning off.  Overall sleep is doing better, reinforced good sleep hygiene.

## 2023-01-27 NOTE — ASSESSMENT & PLAN NOTE
Discussed in great detail initially on 1/24/2017, and many times subsequently.  History of abuse of Xanax, heroin, alcohol and pain medication, Suboxone, and most recently methamphetamines which he has not used now for 2 months, with recent one-month rehabilitation center for which she was discharged in early April 2022.   He continues to follow-up with Narcotics Anonymous.  He was using last Sublocade injections from about late November with his last injection has been doing well off of it.  He does not desire any further medications.  He has good support network, and recognizes the risk of relapse.  I continue to recommend frequent socialization with friends, family, and his AA sponsor.  Advise any further concerns.

## 2023-01-27 NOTE — PROGRESS NOTES
Follow Up Office Visit      Date: 2023   Patient Name: SUSAN Najera  : 1985   MRN: [id number]     Chief Complaint:    Chief Complaint   Patient presents with   • Follow-up       History of Present Illness: SUSAN Najera is a 37 y.o. male who is here today to follow up with multiple medical problems.  Regarding adult ADHD, anxiety and depression, he has most recently been on regimen of Wellbutrin, buspirone, Remeron and prior to that Ambien.  Ambien has long since been discontinued.  Remeron use of some regularity recently but has been weaning off that from a sleep perspective as he is doing better.  He also feels overall that he is doing better and has started to wean the dosing of buspirone prior to Wellbutrin.  We discussed in detail not to abstain from all these medicines at once.  Nonetheless he feels his mood is doing better.  If the symptoms recur he might benefit from going back on a more modest regimen of medication.  Bilateral back pain describes somewhat chronic but periodically flares up.  More in the lower back, slight right greater than left-sided from the mid lumbar region.  No shooting pain down the legs.  No weakness in the legs.  Previous use of tizanidine had given some help for nighttime spasms, he request refill but we discussed that this is not a chronic medicine to use.  He does not sound like he is used anti-inflammatory of any regularity.  Related hepatitis C virus infection, he is 1 a genotype, and was treated through  hepatitis clinic, which was done by phone with laboratory work done through [facility].  He has completed round of antiviral therapy and it sounds like he needs to call  to get his follow-up lab testing done to ensure eradication was successful.  Psoriasis still flares more so winter months, especially in scalp region more so than the skin of the body.  He does request refills of the medicines that have done well for him which  "could include clobetasol shampoo and triamcinolone cream.    Subjective      Review of Systems:   Review of Systems    I have reviewed the patients family history, social history, past medical history, past surgical history and have updated it as appropriate.     Medications:     Current Outpatient Medications:   •  buPROPion XL (WELLBUTRIN XL) 150 MG 24 hr tablet, Take 150 mg by mouth Every Morning., Disp: , Rfl:   •  busPIRone (BUSPAR) 30 MG tablet, Take 30 mg by mouth 2 (Two) Times a Day., Disp: , Rfl:   •  clobetasol propionate (CLOBEX) 0.05 % shampoo, Apply  topically to the appropriate area as directed Daily., Disp: 118 mL, Rfl: 2  •  mirtazapine (REMERON) 30 MG tablet, Take 30 mg by mouth every night at bedtime., Disp: , Rfl:   •  omeprazole OTC (PriLOSEC OTC) 20 MG EC tablet, Take 20 mg by mouth Daily., Disp: , Rfl:   •  naproxen (NAPROSYN) 375 MG tablet, Take 1 tablet by mouth 2 (Two) Times a Day As Needed for Mild Pain for up to 30 days., Disp: 60 tablet, Rfl: 2  •  Sublocade 100 MG/0.5ML solution prefilled syringe, , Disp: , Rfl:   •  tiZANidine (ZANAFLEX) 4 MG tablet, Take 1 tablet by mouth Every 8 (Eight) Hours As Needed for Muscle Spasms., Disp: 20 tablet, Rfl: 0  •  triamcinolone (KENALOG) 0.1 % cream, Apply 1 application topically to the appropriate area as directed 2 (Two) Times a Day., Disp: 28.4 g, Rfl: 2    Allergies:   No Known Allergies    Objective     Physical Exam: Please see above  Vital Signs:   Vitals:    01/27/23 1442 01/27/23 1458   BP: 144/72 128/70   BP Location: Left arm    Patient Position: Sitting    Cuff Size: Adult    Pulse: 89    Temp: 97.7 °F (36.5 °C)    TempSrc: Temporal    SpO2: 99%    Weight: 85.3 kg (188 lb 2 oz)    Height: 185.4 cm (73\")      Body mass index is 24.82 kg/m².    Physical Exam  Constitutional:       General: He is not in acute distress.     Appearance: Normal appearance. He is not ill-appearing, toxic-appearing or diaphoretic.   HENT:      Head: " Normocephalic and atraumatic.      Right Ear: Tympanic membrane, ear canal and external ear normal.      Left Ear: Tympanic membrane, ear canal and external ear normal.      Nose: Nose normal. No rhinorrhea.      Mouth/Throat:      Mouth: Mucous membranes are moist.      Pharynx: Oropharynx is clear. No oropharyngeal exudate or posterior oropharyngeal erythema.   Neck:      Vascular: No carotid bruit.   Cardiovascular:      Rate and Rhythm: Normal rate and regular rhythm.      Pulses: Normal pulses.      Heart sounds: Normal heart sounds. No murmur heard.    No friction rub. No gallop.   Pulmonary:      Effort: Pulmonary effort is normal. No respiratory distress.      Breath sounds: Normal breath sounds. No stridor. No wheezing.   Abdominal:      General: Abdomen is flat. Bowel sounds are normal. There is no distension.      Palpations: Abdomen is soft. There is no mass.      Tenderness: There is no abdominal tenderness. There is no guarding or rebound.      Hernia: No hernia is present.   Musculoskeletal:      Cervical back: Normal range of motion and neck supple. No tenderness.      Right lower leg: No edema.      Left lower leg: No edema.      Comments: No C/T/L spinous process tenderness.  Paraspinal muscular tenderness in the mid lumbar levels on the right more so than left.  Negative flip sign.  Preservation of strength, sensation and reflexes.   Lymphadenopathy:      Cervical: No cervical adenopathy.   Skin:     General: Skin is warm and dry.      Capillary Refill: Capillary refill takes less than 2 seconds.   Neurological:      General: No focal deficit present.      Mental Status: He is alert and oriented to person, place, and time. Mental status is at baseline.   Psychiatric:         Mood and Affect: Mood normal.         Behavior: Behavior normal.         Thought Content: Thought content normal.         Judgment: Judgment normal.         Procedures    Results:   Labs:   No results found for: HGBA1C, CMP,  CBCDIFFPANEL, CREAT, TSH     Imaging:   No valid procedures specified.     BMI is within normal parameters. No other follow-up for BMI required.    Smoking Cessation:   3-10 mintues spent counseling Will try to cut down    Vaccine Counseling:  “Discussed risks/benefits to vaccination, reviewed components of the vaccine, discussed VIS, discussed informed consent, informed consent obtained. Patient/Parent was allowed to accept or refuse vaccine. Questions answered to satisfactory state of patient/Parent. We reviewed typical age appropriate and seasonally appropriate vaccinations. Reviewed immunization history and updated state vaccination form as needed. Patient was counseled on Tdap      Assessment / Plan      Assessment/Plan:   Diagnoses and all orders for this visit:    1. Chronic bilateral low back pain without sciatica (Primary)  Assessment & Plan:  Somewhat of a longstanding pattern for the last couple years, waxing waning with more right greater than left lower back pain in the mid lumbar region.  No neurologic concerns, negative flip sign.  Preservation of strength sensation reflexes.  Due to chronicity I would like to obtain x-ray of the lumbar spine locally at [facility], if reassuring pursue physical therapy to evaluate and treat for more long-term benefits.  For short-term benefit I provided tizanidine 4 mg tablet 3 times daily as needed, use mostly nighttime, caution sedation.  I have addition provided naproxen 375 mg twice daily as needed, to be used in short burst when he has more of a notable flare.  Heating pad, light stretching.  Reassess at follow-up.    Orders:  -     XR Spine Lumbar Complete 4+VW; Future  -     tiZANidine (ZANAFLEX) 4 MG tablet; Take 1 tablet by mouth Every 8 (Eight) Hours As Needed for Muscle Spasms.  Dispense: 20 tablet; Refill: 0  -     naproxen (NAPROSYN) 375 MG tablet; Take 1 tablet by mouth 2 (Two) Times a Day As Needed for Mild Pain for up to 30 days.   Dispense: 60 tablet; Refill: 2    2. Psoriasis  Assessment & Plan:  Longstanding pattern for which it waxes and wanes, most notably flaring up in the winter months.  Clobetasol 0.05% shampoo has generally done well but he has notable flare in the scalp region, and historically triamcinolone 0.1% cream benefits for nonfacial regions.  I refill these medicines, otherwise reinforcing good skin hygiene which has been discussed also in the past.  Historically some tendency for secondary impetigo, currently doing better.    Orders:  -     clobetasol propionate (CLOBEX) 0.05 % shampoo; Apply  topically to the appropriate area as directed Daily.  Dispense: 118 mL; Refill: 2  -     triamcinolone (KENALOG) 0.1 % cream; Apply 1 application topically to the appropriate area as directed 2 (Two) Times a Day.  Dispense: 28.4 g; Refill: 2    3. Chronic hepatitis C without hepatic coma (HCC)  Assessment & Plan:  Hepatitis C virus positive, verified with blood work 5/17/2022.  Follow-up subsequently with  hepatology clinic which was performed in essence through the phone with laboratory blood work done through [facility] showed hepatitis C 1a genotype for which he has completed a round of antiviral treatment.  He needs to call back to  clinic because it sounds like he needs to have his final laboratory assessment to see if he is eradicated hepatitis C.  He will pursue soon.      4. Anxiety and depression  Assessment & Plan:  Previous use of Effexor 150 mg ER, Wellbutrin on 7/25/2017, Celexa in 2017 with some benefit.  Subsequent prescriptions from psychiatry at [facility] being Paxil 30 mg daily, buspirone 15 mg twice daily, Ambien at night.  Most recently, he had been on regimen of Ambien, Wellbutrin, buspirone and Remeron.  He stopped Ambien, has been in the process of weaning off the buspirone, with a goal to stop the medicines as he feels he is doing overall well.  I have cautioned him to  gradually transition off, but discuss this with psychiatry if he has further concerns.  I would also be happy to reassess myself in the future if he desires.   I continue to recommend lifestyle modifications to benefit needed including improved socialization, proceeded of activities enjoyment, et cetera.  Advised new-onset concerns, SI/HI would need to be urgent reevaluated.      5. Adult ADHD  Assessment & Plan:  Long-standing previous diagnosis, with evaluation by a psychologist with positive pattern of ADHD as would be expected.  I discussed the potential for medication treatment, although with his history of substance abuse, stimulant therapy would not be preferable, Strattera was equivocally beneficial, he could consider guanfacine the future, but at this time does not desire any medication.      6. Mixed, or nondependent drug abuse, continuous (HCC)  Assessment & Plan:  Discussed in great detail initially on 1/24/2017, and many times subsequently.  History of abuse of Xanax, heroin, alcohol and pain medication, Suboxone, and most recently methamphetamines which he has not used now for 2 months, with recent one-month rehabilitation center for which she was discharged in early April 2022.   He continues to follow-up with Narcotics Anonymous.  He was using last Sublocade injections from about late November with his last injection has been doing well off of it.  He does not desire any further medications.  He has good support network, and recognizes the risk of relapse.  I continue to recommend frequent socialization with friends, family, and his AA sponsor.  Advise any further concerns.      7. Psychophysiological insomnia  Assessment & Plan:   longstanding and associated to anxiety and depressive symptoms for which she had previously been using Ambien and Remeron, weaning off Ambien previously, Remeron in the process of weaning off.  Overall sleep is doing better, reinforced good sleep hygiene.      8. Vaping  nicotine dependence, tobacco product  Assessment & Plan:  Ongoing vaping daily, previous smoking/cigarette use cessation in approximately 2021.  I have nonetheless reinforced that there are significant risk of ongoing vaping and recommend cessation.  He will consider and try to start cutting back.      9. Need for vaccination  Assessment & Plan:  Plan to have Tdap given ACS past due, but patient left before being administered.  Plan to give at his follow-up visit.    Orders:  -     Cancel: Tdap Vaccine Greater Than or Equal To 8yo IM      Follow Up:   Return in about 3 months (around 4/27/2023) for Annual physical.    I have spent a total of 42 min on reviewing test results/preparing to see patient, counseling patient, performing medically appropriate exam and documenting clinical information in the electronic or other health record     Marvin Pinto MD  [facility]    Clear

## 2023-03-16 DIAGNOSIS — L40.9 PSORIASIS: ICD-10-CM

## 2023-03-16 RX ORDER — CLOBETASOL PROPIONATE 0.05 G/100ML
SHAMPOO TOPICAL DAILY
Qty: 118 ML | Refills: 2 | Status: SHIPPED | OUTPATIENT
Start: 2023-03-16

## 2023-03-16 NOTE — TELEPHONE ENCOUNTER
Caller: SUSAN Najera    Relationship: Self    Best call back number: [phone]    Requested Prescriptions:   Requested Prescriptions     Pending Prescriptions Disp Refills   • clobetasol propionate (CLOBEX) 0.05 % shampoo 118 mL 2     Sig: Apply  topically to the appropriate area as directed Daily.        Pharmacy where request should be sent: [facility]/PHARMACY #[id number] - [place], [place] - [address]EXT TO [facility] - [phone]  - [phone] FX     Additional details provided by patient:  PATIENT WOULD LIKE FOR A STEROID CREAM TO BE CALLED INTO THE PHARMACY TO HELP WITH THE CIRRHOSIS ON HIS SKIN     Does the patient have less than a 3 day supply:  [x] Yes  [] No    Would you like a call back once the refill request has been completed: [x] Yes [] No    If the office needs to give you a call back, can they leave a voicemail: [x] Yes [] No    Horacio Johnson Rep   03/16/23 16:13 EDT

## 2023-04-05 ENCOUNTER — OFFICE VISIT (OUTPATIENT)
Dept: FAMILY MEDICINE CLINIC | Facility: CLINIC | Age: 38
End: 2023-04-05
Payer: MEDICAID

## 2023-04-05 VITALS
DIASTOLIC BLOOD PRESSURE: 84 MMHG | TEMPERATURE: 97.1 F | WEIGHT: 183 LBS | RESPIRATION RATE: 18 BRPM | HEIGHT: 73 IN | HEART RATE: 73 BPM | SYSTOLIC BLOOD PRESSURE: 138 MMHG | OXYGEN SATURATION: 98 % | BODY MASS INDEX: 24.25 KG/M2

## 2023-04-05 DIAGNOSIS — A53.9 SYPHILIS: Primary | ICD-10-CM

## 2023-04-05 RX ORDER — CEFTRIAXONE 1 G/1
1 INJECTION, POWDER, FOR SOLUTION INTRAMUSCULAR; INTRAVENOUS ONCE
Status: COMPLETED | OUTPATIENT
Start: 2023-04-05 | End: 2023-04-05

## 2023-04-05 RX ADMIN — CEFTRIAXONE 1 G: 1 INJECTION, POWDER, FOR SOLUTION INTRAMUSCULAR; INTRAVENOUS at 10:45

## 2023-04-05 NOTE — PROGRESS NOTES
Office Note     Name: SUSAN Najera    : 1985     MRN: [id number]     Chief Complaint  std testing    Subjective     History of Present Illness:  SUSAN Najera is a 37 y.o. male who presents today for follow-up on previously diagnosed syphilis.  He reports he was initially evaluated at both the [facility] department in [facility] emergency department after developing sores on his penis and scrotum.  Per ED report he was told by a previous sexual partner she had herpes but that they were not contagious at the time.  After emergency department evaluation he was told to have syphilis and given penicillin G injection.  He reports he then followed up with [facility] department and was given another injection.  He continues to have 2 canker like lesions on the posterior side of the scrotum.  He denies any penile discharge, fever, altered mental status.  He now has a new sexual partner and is concerned about transmitting syphilis to her, though she has tested negative currently.  We discussed the importance of abstinence but if he is insistent on being sexually active it is imperative to utilize condoms with every encounter.  He has no further complaints or concerns today    Review of Systems   Constitutional: Negative for activity change, appetite change, fatigue, fever and unexpected weight loss.   Eyes: Negative for blurred vision, double vision, photophobia and pain.   Respiratory: Negative for cough and shortness of breath.    Cardiovascular: Negative for chest pain, palpitations and leg swelling.   Gastrointestinal: Negative for abdominal pain, constipation, diarrhea, nausea and vomiting.   Genitourinary: Positive for genital sores. Negative for decreased urine volume, difficulty urinating, discharge, flank pain, frequency, penile pain, penile swelling, scrotal swelling, testicular pain and urgency.   Musculoskeletal: Negative for arthralgias and myalgias.  "  Neurological: Negative for dizziness, syncope, weakness, light-headedness, numbness, headache and confusion.       Objective     Past Medical History:   Diagnosis Date   • Acute laryngitis    • ADHD    • Depression    • Drug abuse     long-standing history of drug abuse with multiple attempts to discontinue, including use of Xanax, alcohol, heroin and pain medications, onset of use in his early 21   • Generalized anxiety disorder    • Insomnia due to medical condition    • Lower back pain    • Other fatigue    • Other psoriasis    • Seasonal allergic rhinitis    • Testicular hypofunction      No past surgical history on file.  No family history on file.    Vital Signs  /84 (BP Location: Left arm, Patient Position: Sitting, Cuff Size: Adult)   Pulse 73   Temp 97.1 °F (36.2 °C) (Temporal)   Resp 18   Ht 185.4 cm (73\")   Wt 83 kg (183 lb)   SpO2 98%   BMI 24.14 kg/m²   Estimated body mass index is 24.14 kg/m² as calculated from the following:    Height as of this encounter: 185.4 cm (73\").    Weight as of this encounter: 83 kg (183 lb).    Physical Exam  Vitals reviewed. Chaperone present: 2 cankers noted to posterior aspect of the scrotum.  Currently using.  No swollen lymph nodes noted.   Constitutional:       Appearance: Normal appearance.   HENT:      Head: Normocephalic and atraumatic.      Nose: Nose normal.      Mouth/Throat:      Pharynx: Oropharynx is clear.   Eyes:      Conjunctiva/sclera: Conjunctivae normal.   Cardiovascular:      Rate and Rhythm: Normal rate and regular rhythm.      Pulses: Normal pulses.      Heart sounds: Normal heart sounds.   Pulmonary:      Effort: Pulmonary effort is normal.      Breath sounds: Normal breath sounds.   Abdominal:      Palpations: Abdomen is soft.   Genitourinary:     Pubic Area: No rash.       Penis: No tenderness or discharge.       Testes:         Right: Tenderness or swelling not present.         Left: Tenderness or swelling not present.      " Epididymis:      Right: Normal.      Left: Normal.   Musculoskeletal:      Cervical back: Normal range of motion and neck supple.   Skin:     General: Skin is warm and dry.   Neurological:      Mental Status: He is alert and oriented to person, place, and time.            POCT Results (if applicable):  Results for orders placed or performed in visit on 12/23/21   Comprehensive Metabolic Panel    Specimen: Blood   Result Value Ref Range    Glucose 97 65 - 99 mg/dL    BUN 7 6 - 20 mg/dL    Creatinine 0.95 0.76 - 1.27 mg/dL    Sodium 139 136 - 145 mmol/L    Potassium 4.6 3.5 - 5.2 mmol/L    Chloride 100 98 - 107 mmol/L    CO2 29.1 (H) 22.0 - 29.0 mmol/L    Calcium 9.1 8.6 - 10.5 mg/dL    Total Protein 6.9 6.0 - 8.5 g/dL    Albumin 4.00 3.50 - 5.20 g/dL    ALT (SGPT) 83 (H) 1 - 41 U/L    AST (SGOT) 38 1 - 40 U/L    Alkaline Phosphatase 60 39 - 117 U/L    Total Bilirubin 0.2 0.0 - 1.2 mg/dL    eGFR Non African Amer 90 >60 mL/min/1.73    Globulin 2.9 gm/dL    A/G Ratio 1.4 g/dL    BUN/Creatinine Ratio 7.4 7.0 - 25.0    Anion Gap 9.9 5.0 - 15.0 mmol/L   HIV-1 / O / 2 Ag / Antibody 4th Generation    Specimen: Blood   Result Value Ref Range    HIV-1/ HIV-2 Non-Reactive Non-Reactive   CBC Auto Differential    Specimen: Blood   Result Value Ref Range    WBC 10.66 3.40 - 10.80 10*3/mm3    RBC 4.70 4.14 - 5.80 10*6/mm3    Hemoglobin 14.1 13.0 - 17.7 g/dL    Hematocrit 41.9 37.5 - 51.0 %    MCV 89.1 79.0 - 97.0 fL    MCH 30.0 26.6 - 33.0 pg    MCHC 33.7 31.5 - 35.7 g/dL    RDW 13.8 12.3 - 15.4 %    RDW-SD 44.5 37.0 - 54.0 fl    MPV 10.9 6.0 - 12.0 fL    Platelets 373 140 - 450 10*3/mm3    Neutrophil % 67.9 42.7 - 76.0 %    Lymphocyte % 19.1 (L) 19.6 - 45.3 %    Monocyte % 10.3 5.0 - 12.0 %    Eosinophil % 1.3 0.3 - 6.2 %    Basophil % 0.8 0.0 - 1.5 %    Immature Grans % 0.6 (H) 0.0 - 0.5 %    Neutrophils, Absolute 7.23 (H) 1.70 - 7.00 10*3/mm3    Lymphocytes, Absolute 2.04 0.70 - 3.10 10*3/mm3    Monocytes, Absolute 1.10 (H) 0.10  - 0.90 10*3/mm3    Eosinophils, Absolute 0.14 0.00 - 0.40 10*3/mm3    Basophils, Absolute 0.09 0.00 - 0.20 10*3/mm3    Immature Grans, Absolute 0.06 (H) 0.00 - 0.05 10*3/mm3    nRBC 0.0 0.0 - 0.2 /100 WBC            Assessment and Plan     Diagnoses and all orders for this visit:    1. Syphilis (Primary)  Assessment & Plan:  Diagnosed with syphilis at the [facility] emergency department after unprotected sex with a female partner.  Reportedly he was told by my partner she had herpes but was not currently contagious.  He received 1 shot of penicillin G at the emergency department.  Received second shot at the [facility] department 1 week ago.  He reports most cankers have healed with the exception of 2 on the posterior aspect of his scrotum.  He denies any fever, penile discharge, vision changes or altered mentation.  He now has a new sexual partner and has partaking in unprotected sex with her.  He has concerns that he will pass syphilis onto her, though she has been tested and is currently negative.  Discussed in depth the concept of accidents as well as if he does have a sexual encounter to always utilize condoms to prevent recurrence or spread of current infection.  Will give third and hopefully final shot of Rocephin in office today.    Orders:  -     cefTRIAXone (ROCEPHIN) injection 1 g    BMI is within normal parameters. No other follow-up for BMI required.    I spent 30 minutes caring for SUSAN on this date of service. This time includes time spent by me in the following activities:preparing for the visit, performing a medically appropriate examination and/or evaluation , counseling and educating the patient/family/caregiver, ordering medications, tests, or procedures and documenting information in the medical record      Follow Up  No follow-ups on file.    Ashley Real, APRN

## 2023-04-05 NOTE — ASSESSMENT & PLAN NOTE
Diagnosed with syphilis at the [facility] emergency department after unprotected sex with a female partner.  Reportedly he was told by my partner she had herpes but was not currently contagious.  He received 1 shot of penicillin G at the emergency department.  Received second shot at the [facility] department 1 week ago.  He reports most cankers have healed with the exception of 2 on the posterior aspect of his scrotum.  He denies any fever, penile discharge, vision changes or altered mentation.  He now has a new sexual partner and has partaking in unprotected sex with her.  He has concerns that he will pass syphilis onto her, though she has been tested and is currently negative.  Discussed in depth the concept of accidents as well as if he does have a sexual encounter to always utilize condoms to prevent recurrence or spread of current infection.  Will give third and hopefully final shot of Rocephin in office today.

## 2023-04-06 ENCOUNTER — PATIENT ROUNDING (BHMG ONLY) (OUTPATIENT)
Dept: FAMILY MEDICINE CLINIC | Facility: CLINIC | Age: 38
End: 2023-04-06
Payer: MEDICAID

## 2023-04-06 NOTE — PROGRESS NOTES
.A [employer] message has been sent to the patient for patient rounding with [facility].

## 2023-04-17 DIAGNOSIS — L40.9 PSORIASIS: ICD-10-CM

## 2023-04-17 RX ORDER — CLOBETASOL PROPIONATE 0.05 G/100ML
SHAMPOO TOPICAL DAILY
Qty: 118 ML | Refills: 2 | Status: SHIPPED | OUTPATIENT
Start: 2023-04-17

## 2023-04-17 RX ORDER — TRIAMCINOLONE ACETONIDE 1 MG/G
1 CREAM TOPICAL 2 TIMES DAILY
Qty: 28.4 G | Refills: 2 | Status: SHIPPED | OUTPATIENT
Start: 2023-04-17

## 2023-04-17 RX ORDER — MIRTAZAPINE 30 MG/1
30 TABLET, FILM COATED ORAL
Qty: 30 TABLET | Refills: 0 | Status: SHIPPED | OUTPATIENT
Start: 2023-04-17

## 2023-04-17 NOTE — TELEPHONE ENCOUNTER
Caller: SUSAN Najera    Relationship: Self    Best call back number: [phone]     Requested Prescriptions:   Requested Prescriptions     Pending Prescriptions Disp Refills   • triamcinolone (KENALOG) 0.1 % cream 28.4 g 2     Sig: Apply 1 application topically to the appropriate area as directed 2 (Two) Times a Day.   • clobetasol propionate (CLOBEX) 0.05 % shampoo 118 mL 2     Sig: Apply  topically to the appropriate area as directed Daily.   • mirtazapine (REMERON) 30 MG tablet       Sig: Take 1 tablet by mouth every night at bedtime.        Pharmacy where request should be sent: [id number] - [place], [place] - [address] AT NEXT TO [facility] [phone] [facility] [phone]      Last office visit with prescribing clinician: 1/27/2023   Last telemedicine visit with prescribing clinician: 4/28/2023   Next office visit with prescribing clinician: 4/28/2023     Additional details provided by patient: PATIENT IS OUT OF MEDICATION    Does the patient have less than a 3 day supply:  [x] Yes  [] No    Would you like a call back once the refill request has been completed: [] Yes [] No    If the office needs to give you a call back, can they leave a voicemail: [] Yes [] No    Horacio Tello Rep   04/17/23 13:40 EDT

## 2023-04-20 DIAGNOSIS — G89.29 CHRONIC BILATERAL LOW BACK PAIN WITHOUT SCIATICA: ICD-10-CM

## 2023-04-20 DIAGNOSIS — M54.50 CHRONIC BILATERAL LOW BACK PAIN WITHOUT SCIATICA: ICD-10-CM

## 2023-04-20 RX ORDER — NAPROXEN 375 MG/1
375 TABLET ORAL 2 TIMES DAILY PRN
Qty: 60 TABLET | Refills: 2 | OUTPATIENT
Start: 2023-04-20 | End: 2023-05-20

## 2023-06-08 RX ORDER — PAROXETINE HYDROCHLORIDE 40 MG/1
TABLET, FILM COATED ORAL
Qty: 30 TABLET | Refills: 1 | OUTPATIENT
Start: 2023-06-08

## 2023-08-18 RX ORDER — MIRTAZAPINE 30 MG/1
TABLET, FILM COATED ORAL
Qty: 30 TABLET | Refills: 0 | Status: SHIPPED | OUTPATIENT
Start: 2023-08-18

## 2023-09-19 RX ORDER — MIRTAZAPINE 30 MG/1
TABLET, FILM COATED ORAL
Qty: 30 TABLET | Refills: 0 | OUTPATIENT
Start: 2023-09-19

## 2023-10-02 RX ORDER — MIRTAZAPINE 30 MG/1
TABLET, FILM COATED ORAL
Qty: 30 TABLET | Refills: 0 | OUTPATIENT
Start: 2023-10-02